# Patient Record
Sex: MALE | Race: WHITE | URBAN - METROPOLITAN AREA
[De-identification: names, ages, dates, MRNs, and addresses within clinical notes are randomized per-mention and may not be internally consistent; named-entity substitution may affect disease eponyms.]

---

## 2022-05-11 ENCOUNTER — TRANSCRIBE ORDER (OUTPATIENT)
Dept: SCHEDULING | Age: 57
End: 2022-05-11

## 2022-05-11 DIAGNOSIS — F17.211 CIGARETTE NICOTINE DEPENDENCE IN REMISSION: Primary | ICD-10-CM

## 2022-12-02 ENCOUNTER — HOSPITAL ENCOUNTER (OUTPATIENT)
Dept: CT IMAGING | Age: 57
End: 2022-12-02
Attending: FAMILY MEDICINE
Payer: COMMERCIAL

## 2022-12-02 VITALS — WEIGHT: 201 LBS | BODY MASS INDEX: 28.77 KG/M2 | HEIGHT: 70 IN

## 2022-12-02 DIAGNOSIS — F17.211 CIGARETTE NICOTINE DEPENDENCE IN REMISSION: ICD-10-CM

## 2022-12-02 PROCEDURE — 71271 CT THORAX LUNG CANCER SCR C-: CPT

## 2023-03-16 ENCOUNTER — OFFICE VISIT (OUTPATIENT)
Age: 58
End: 2023-03-16

## 2023-03-16 VITALS
BODY MASS INDEX: 29.92 KG/M2 | SYSTOLIC BLOOD PRESSURE: 128 MMHG | RESPIRATION RATE: 20 BRPM | DIASTOLIC BLOOD PRESSURE: 84 MMHG | WEIGHT: 209 LBS | HEART RATE: 82 BPM | HEIGHT: 70 IN | OXYGEN SATURATION: 97 %

## 2023-03-16 DIAGNOSIS — R25.2 HAND OR FOOT SPASMS: ICD-10-CM

## 2023-03-16 DIAGNOSIS — G56.03 BILATERAL CARPAL TUNNEL SYNDROME: Primary | ICD-10-CM

## 2023-03-16 PROCEDURE — 99203 OFFICE O/P NEW LOW 30 MIN: CPT | Performed by: STUDENT IN AN ORGANIZED HEALTH CARE EDUCATION/TRAINING PROGRAM

## 2023-03-16 PROCEDURE — 99204 OFFICE O/P NEW MOD 45 MIN: CPT | Performed by: STUDENT IN AN ORGANIZED HEALTH CARE EDUCATION/TRAINING PROGRAM

## 2023-03-16 RX ORDER — ERGOCALCIFEROL 1.25 MG/1
CAPSULE ORAL
COMMUNITY
Start: 2023-02-15

## 2023-03-16 RX ORDER — OMEPRAZOLE 40 MG/1
CAPSULE, DELAYED RELEASE ORAL
COMMUNITY
Start: 2023-02-15

## 2023-03-16 RX ORDER — OLIVE OIL
OIL (ML) MISCELLANEOUS
COMMUNITY
Start: 2023-02-15

## 2023-03-16 RX ORDER — FLUTICASONE PROPIONATE 50 MCG
SPRAY, SUSPENSION (ML) NASAL
COMMUNITY
Start: 2023-02-15

## 2023-03-16 RX ORDER — HYDROCODONE BITARTRATE AND ACETAMINOPHEN 5; 325 MG/1; MG/1
1 TABLET ORAL EVERY 4 HOURS PRN
COMMUNITY
Start: 2016-09-18

## 2023-03-16 RX ORDER — METHOCARBAMOL 500 MG/1
TABLET, FILM COATED ORAL
COMMUNITY
Start: 2023-02-15

## 2023-03-16 RX ORDER — AMLODIPINE BESYLATE 10 MG/1
TABLET ORAL
COMMUNITY
Start: 2023-02-15

## 2023-03-16 RX ORDER — TRAMADOL HYDROCHLORIDE 50 MG/1
50 TABLET ORAL EVERY 6 HOURS PRN
COMMUNITY
Start: 2016-09-18

## 2023-03-16 ASSESSMENT — ENCOUNTER SYMPTOMS
NAUSEA: 0
VOMITING: 0
BACK PAIN: 1
COUGH: 0
SHORTNESS OF BREATH: 0

## 2023-03-16 NOTE — PROGRESS NOTES
Porsha Smith is a 62 y.o. male . presents for No chief complaint on file. A 62year-old patient with medical history of hypertension and GERD referred here for evaluation of hand pain. Has been having intermittent cramp over his hands. When he is writing or at work for some time, his fingers seem to get stiff and bend. He has to shake his hands or apply pressure to relieve the cramp. He constantly uses his hands a lot at work. Symptoms more on the left side; he is left-handed. Previously, used to get numbness over his arm when driving; did not happen in recent times. When he is sleeping on his belly with his arms bent, might make up for his complete weakness which gets better gradually. In the morning, has difficulty making a fist.  Also has possible additional joint pain at the base of his thumb. Also complains of pain over his ankles and lower extremity. He has pain over the base of his neck; associated with headache. Neck pain does not radiate to the lower extremities. No history of diabetes. Review of Systems   Constitutional:  Negative for chills, fever and unexpected weight change. HENT:  Positive for hearing loss and tinnitus. Respiratory:  Negative for cough and shortness of breath. Cardiovascular:  Negative for chest pain and leg swelling. Gastrointestinal:  Negative for nausea and vomiting. Genitourinary:  Positive for frequency and urgency. Negative for dysuria. Musculoskeletal:  Positive for arthralgias, back pain (pressure sensation) and neck pain. Skin:  Negative for rash. Neurological:  Positive for numbness and headaches. Negative for dizziness, tremors, seizures, syncope, facial asymmetry and speech difficulty. No past medical history on file. No past surgical history on file. No family history on file.      Social History     Socioeconomic History    Marital status:      Spouse name: Not on file    Number of children: Not on file    Years of education: Not on file    Highest education level: Not on file   Occupational History    Not on file   Tobacco Use    Smoking status: Not on file    Smokeless tobacco: Not on file   Substance and Sexual Activity    Alcohol use: Not on file    Drug use: Not on file    Sexual activity: Not on file   Other Topics Concern    Not on file   Social History Narrative    Not on file     Social Determinants of Health     Financial Resource Strain: Not on file   Food Insecurity: Not on file   Transportation Needs: Not on file   Physical Activity: Not on file   Stress: Not on file   Social Connections: Not on file   Intimate Partner Violence: Not on file   Housing Stability: Not on file        No Known Allergies      Current Outpatient Medications   Medication Sig Dispense Refill    amLODIPine (NORVASC) 10 MG tablet take 1 tablet by mouth once daily      RA EAR DROPS 6.5 % otic solution instill 10 drops into each ear once daily for 4 days      vitamin D (ERGOCALCIFEROL) 1.25 MG (09782 UT) CAPS capsule take 1 capsule by mouth every week      fluticasone (FLONASE) 50 MCG/ACT nasal spray instill 1 spray into each nostril once daily      HYDROcodone-acetaminophen (NORCO) 5-325 MG per tablet Take 1 tablet by mouth every 4 hours as needed. methocarbamol (ROBAXIN) 500 MG tablet take 1 tablet by mouth twice a day if needed for muscle spasm      omeprazole (PRILOSEC) 40 MG delayed release capsule take 1 capsule by mouth twice a day      traMADol (ULTRAM) 50 MG tablet Take 50 mg by mouth every 6 hours as needed. No current facility-administered medications for this visit. Physical Exam  Constitutional:       Appearance: Normal appearance. HENT:      Head: Normocephalic and atraumatic. Mouth/Throat:      Mouth: Mucous membranes are moist.      Pharynx: Oropharynx is clear. No oropharyngeal exudate. Eyes:      Extraocular Movements: Extraocular movements intact.       Pupils: Pupils are equal, round, and reactive to light. Pulmonary:      Effort: Pulmonary effort is normal. No respiratory distress. Musculoskeletal:         General: Normal range of motion. Cervical back: Normal range of motion and neck supple. Right lower leg: No edema. Left lower leg: No edema. Neurological:      Mental Status: He is alert. Comments: Mental status: Awake, alert, oriented , follows simple and complex commands, no neglect, no extinction. Speech and languge: fluent, coherent,  and comprehension intact  CN: VFF, EOMI, PERRLA, face sensation intact , no facial asymmetry noted, palate elevation symmetric bilat, SS+SCM 5/5 bilat, tongue midline  Motor: no pronator drift, tone normal throughout, strength 5/5 throughout  Sensory: intact to light touch and PP  throughout but slightly decreased over the hands. Positive Tinel on the right side. Coordination: FNF accurate w/o dysmetria  DTR: 2+ throughout, toes downgoing BL  Gait: Normal.         No visits with results within 3 Month(s) from this visit. Latest known visit with results is:   No results found for any previous visit. Assessment:    1. Bilateral carpal tunnel syndrome    - EMG TWO EXTREMITIES UPPER; Future    2. Hand or foot spasms  - EMG TWO EXTREMITIES UPPER; Future     A [id number] old left-handed male patient with history of hypertension referred here for evaluation of possible carpal tunnel. Patient has been having intermittent cramp over his hands more on the left side. His hands go in  to spasm and he tends to bend his fingers after using them for some time for example at workplace or when writing. He said he has recent labs including potassium and calcium; was not told about any abnormality. Differentials include bilateral carpal tunnel syndrome and carpal spasms like as seen in electrolyte changes. During blood pressure check, did not have the typical carpal spasm. We will get EMG of his upper extremities.   We will see him again when he comes back for the EMG. PLEASE NOTE:   This document has been produced using voice recognition software. Unrecognized errors in transcription may be present.